# Patient Record
Sex: FEMALE
[De-identification: names, ages, dates, MRNs, and addresses within clinical notes are randomized per-mention and may not be internally consistent; named-entity substitution may affect disease eponyms.]

---

## 2017-03-08 ENCOUNTER — RESULT REVIEW (OUTPATIENT)
Age: 52
End: 2017-03-08

## 2017-08-01 ENCOUNTER — TRANSCRIPTION ENCOUNTER (OUTPATIENT)
Age: 52
End: 2017-08-01

## 2018-02-16 ENCOUNTER — OUTPATIENT (OUTPATIENT)
Dept: OUTPATIENT SERVICES | Facility: HOSPITAL | Age: 53
LOS: 1 days | End: 2018-02-16
Payer: COMMERCIAL

## 2018-02-16 ENCOUNTER — TRANSCRIPTION ENCOUNTER (OUTPATIENT)
Age: 53
End: 2018-02-16

## 2018-02-16 VITALS
TEMPERATURE: 99 F | SYSTOLIC BLOOD PRESSURE: 144 MMHG | HEIGHT: 66 IN | HEART RATE: 72 BPM | WEIGHT: 138.45 LBS | DIASTOLIC BLOOD PRESSURE: 74 MMHG | OXYGEN SATURATION: 100 % | RESPIRATION RATE: 18 BRPM

## 2018-02-16 VITALS
DIASTOLIC BLOOD PRESSURE: 73 MMHG | OXYGEN SATURATION: 100 % | RESPIRATION RATE: 11 BRPM | SYSTOLIC BLOOD PRESSURE: 123 MMHG | HEART RATE: 69 BPM

## 2018-02-16 DIAGNOSIS — H50.112 MONOCULAR EXOTROPIA, LEFT EYE: ICD-10-CM

## 2018-02-16 DIAGNOSIS — Z87.2 PERSONAL HISTORY OF DISEASES OF THE SKIN AND SUBCUTANEOUS TISSUE: Chronic | ICD-10-CM

## 2018-02-16 DIAGNOSIS — H50.21 VERTICAL STRABISMUS, RIGHT EYE: ICD-10-CM

## 2018-02-16 DIAGNOSIS — H50.111 MONOCULAR EXOTROPIA, RIGHT EYE: ICD-10-CM

## 2018-02-16 DIAGNOSIS — Z98.890 OTHER SPECIFIED POSTPROCEDURAL STATES: Chronic | ICD-10-CM

## 2018-02-16 DIAGNOSIS — H50.9 UNSPECIFIED STRABISMUS: Chronic | ICD-10-CM

## 2018-02-16 DIAGNOSIS — H50.22 VERTICAL STRABISMUS, LEFT EYE: ICD-10-CM

## 2018-02-16 LAB — HCG UR QL: NEGATIVE — SIGNIFICANT CHANGE UP

## 2018-02-16 PROCEDURE — 67311 REVISE EYE MUSCLE: CPT | Mod: 50

## 2018-02-16 PROCEDURE — 67314 REVISE EYE MUSCLE: CPT | Mod: LT

## 2018-02-16 PROCEDURE — 81025 URINE PREGNANCY TEST: CPT

## 2018-02-16 NOTE — ASU DISCHARGE PLAN (ADULT/PEDIATRIC). - SPECIAL INSTRUCTIONS
resume usual diet at home, as tolerated  no swimming or playing in dirt for 2 weeks  clean bloody tears with moist cloth or paper towel as needed  resume spectacle use the day after surgery, if glasses are worn  Maxitrol ophthalmic ointment to operated eye(s) three times daily for one week.

## 2018-02-16 NOTE — ASU DISCHARGE PLAN (ADULT/PEDIATRIC). - NOTIFY
Increased Irritability or Sluggishness/Bleeding that does not stop/Swelling that continues/Persistent Nausea and Vomiting/Fever greater than 101/Unable to Urinate/Pain not relieved by Medications/Inability to Tolerate Liquids or Foods

## 2018-02-16 NOTE — ASU DISCHARGE PLAN (ADULT/PEDIATRIC). - PT EDUC
Eye shield with instructions , sunglasses and eye kit given to patient./other (specify) other (specify)/sunglasses and eye kit given to patient.

## 2018-03-14 ENCOUNTER — RESULT REVIEW (OUTPATIENT)
Age: 53
End: 2018-03-14

## 2018-06-23 ENCOUNTER — TRANSCRIPTION ENCOUNTER (OUTPATIENT)
Age: 53
End: 2018-06-23

## 2019-03-22 ENCOUNTER — RESULT REVIEW (OUTPATIENT)
Age: 54
End: 2019-03-22

## 2022-01-14 NOTE — ASU PATIENT PROFILE, ADULT - PATIENT REPRESENTATIVE PHONE
[phone] Methotrexate Pregnancy And Lactation Text: This medication is Pregnancy Category X and is known to cause fetal harm. This medication is excreted in breast milk.